# Patient Record
Sex: MALE | Race: WHITE | NOT HISPANIC OR LATINO | Employment: OTHER | URBAN - METROPOLITAN AREA
[De-identification: names, ages, dates, MRNs, and addresses within clinical notes are randomized per-mention and may not be internally consistent; named-entity substitution may affect disease eponyms.]

---

## 2017-01-03 NOTE — PATIENT DISCUSSION
Dry Eye Syndrome Counseling: I have discussed the diagnosis and the pathophysiology of this disease with the patient. Eyelid pathology and systemic illnesses such as Sjogren&rsquo;s disease or rheumatoid arthritis may contribute to severity. Vision may be limited by dry eye, and symptoms exacerbated by environmental factors such as smoke, wind, or prolonged eye use. Treatment options include, but are not limited to, artificial tears, punctal plugs, topical cyclosporine, oral omega-3 supplements, Lipiflow, moisture goggles, and lubricating ointments. I stressed the importance of compliance with treatment.

## 2017-01-03 NOTE — PATIENT DISCUSSION
BLEPHARITIS, OU: PRESCRIBE WARM COMPRESSES AND EYELID SCRUBS QD-BID, ARTIFICIAL TEARS BID-QID, THE DAILY INTAKE OF OMEGA-3 FATTY ACIDS. WILL CONSIDER LIPIFLOW TREATMENT NEXT VISIT IF NOT RESPONSIVE TO TREATMENT OR IF SYMPTOMS PERSIST. RETURN FOR FOLLOW-UP AS SCHEDULED.

## 2017-01-03 NOTE — PATIENT DISCUSSION
Blepharitis Counseling:   I have explained the diagnosis of blepharitis and its pathophysiology. I have explained to the patient that a cure for blepharitis is not usually possible, and successful management is dependent on patient compliance with the treatment regimen. I instructed the patient on using warm compresses and eyelid scrubs. I also instructed the patient on using artificial tears two to four times per day. Return for follow-up as scheduled or sooner if symptoms worsen.

## 2017-01-03 NOTE — PATIENT DISCUSSION
MODERATE DRY EYES: PRESCRIBED DISAPPEARING OTC PRESERVATIVE OR OTC PRESERVATIVE FREE ARTIFICIAL TEARS BID &ndash; QID4-6X A DAY, OU AND THE DAILY INTAKE OF OMEGA-3 DHA/EPA FATTY ACIDS TO HELP RELIEVE SYMPTOMS. ADD NIGHTLY LUBRICATING OINTMENT OR GEL. RX LOTEMAX OR FML QID X 4 DAYS, TID THEREARFTER. CONSIDER PUNCTAL PLUGS AND/OR LIPIFLOW TREATMENT NEXT VISIT IF NOT RESPONSIVE OR IF SYMPTOMS PERSIST. RETURN FOR FOLLOW-UP AS SCHEDULED OR SOONER IF SYMPTOMS WORSEN.

## 2017-01-19 NOTE — PATIENT DISCUSSION
MODERATE DRY EYES: PRESCRIBED DISAPPEARING OTC PRESERVATIVE OR OTC PRESERVATIVE FREE ARTIFICIAL TEARS BID &ndash; QID4-6X A DAY, OU AND THE DAILY INTAKE OF OMEGA-3 DHA/EPA FATTY ACIDS TO HELP RELIEVE SYMPTOMS. ADD NIGHTLY LUBRICATING OINTMENT OR GEL. TAPER FML BID X 1 WEEK, QD X 1 WEEK THEN DISCONTINUE. CONTINUE XIIDRA BID OU. CONSIDER PUNCTAL PLUGS AND/OR LIPIFLOW TREATMENT NEXT VISIT IF NOT RESPONSIVE OR IF SYMPTOMS PERSIST. RETURN FOR FOLLOW-UP AS SCHEDULED OR SOONER IF SYMPTOMS WORSEN.

## 2017-11-01 NOTE — PATIENT DISCUSSION
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  RETURN FOR FOLLOW-UP AS SCHEDULED FOR DILATED EYE EXAM.

## 2018-04-17 NOTE — PATIENT DISCUSSION
GLAUCOMA SUSPECT TESTING: DISCUSSED FOR PATIENT TO RETURN FOR FURTHER TESTING SUCH AS AN HVF 24-2, OCT RNFL, AND PACHYMETRY.

## 2018-08-10 ENCOUNTER — NEW REFERRAL (OUTPATIENT)
Dept: URBAN - METROPOLITAN AREA CLINIC 26 | Facility: CLINIC | Age: 83
End: 2018-08-10

## 2018-08-10 VITALS
DIASTOLIC BLOOD PRESSURE: 68 MMHG | SYSTOLIC BLOOD PRESSURE: 114 MMHG | HEIGHT: 69 IN | BODY MASS INDEX: 29.62 KG/M2 | WEIGHT: 200 LBS

## 2018-08-10 DIAGNOSIS — H35.3231: ICD-10-CM

## 2018-08-10 DIAGNOSIS — H43.813: ICD-10-CM

## 2018-08-10 DIAGNOSIS — H35.63: ICD-10-CM

## 2018-08-10 PROCEDURE — 92235 FLUORESCEIN ANGRPH MLTIFRAME: CPT

## 2018-08-10 PROCEDURE — 67028 INJECTION EYE DRUG: CPT

## 2018-08-10 PROCEDURE — J2778* LUCENTIS 0.1MG

## 2018-08-10 PROCEDURE — 92250 FUNDUS PHOTOGRAPHY W/I&R: CPT

## 2018-08-10 PROCEDURE — 99204 OFFICE O/P NEW MOD 45 MIN: CPT

## 2018-08-10 PROCEDURE — 92134 CPTRZ OPH DX IMG PST SGM RTA: CPT

## 2018-08-10 ASSESSMENT — VISUAL ACUITY
OS_CC: 20/60-1
OD_CC: 20/200+2
OD_SC: 20/200
OS_SC: 20/60-1

## 2018-08-10 ASSESSMENT — TONOMETRY
OD_IOP_MMHG: 13
OS_IOP_MMHG: 14

## 2018-09-14 ENCOUNTER — CLINICAL PROCEDURE AND DIAGNOSTIC TESTING ONLY (OUTPATIENT)
Dept: URBAN - METROPOLITAN AREA CLINIC 26 | Facility: CLINIC | Age: 83
End: 2018-09-14

## 2018-09-14 DIAGNOSIS — H35.3231: ICD-10-CM

## 2018-09-14 PROCEDURE — 67028 INJECTION EYE DRUG: CPT

## 2018-09-14 PROCEDURE — 92250 FUNDUS PHOTOGRAPHY W/I&R: CPT

## 2018-09-14 PROCEDURE — J2778* LUCENTIS 0.1MG

## 2018-09-14 ASSESSMENT — TONOMETRY
OS_IOP_MMHG: 10
OD_IOP_MMHG: 12

## 2018-09-14 ASSESSMENT — VISUAL ACUITY
OS_CC: 20/40-2
OD_CC: 20/80-2

## 2018-10-19 ENCOUNTER — CLINICAL PROCEDURE AND DIAGNOSTIC TESTING ONLY (OUTPATIENT)
Dept: URBAN - METROPOLITAN AREA CLINIC 26 | Facility: CLINIC | Age: 83
End: 2018-10-19

## 2018-10-19 DIAGNOSIS — H35.3231: ICD-10-CM

## 2018-10-19 PROCEDURE — 92250 FUNDUS PHOTOGRAPHY W/I&R: CPT

## 2018-10-19 PROCEDURE — 67028 INJECTION EYE DRUG: CPT

## 2018-10-19 PROCEDURE — J2778* LUCENTIS 0.1MG

## 2018-10-19 ASSESSMENT — VISUAL ACUITY
OS_CC: 20/40-2
OD_CC: 20/200+2

## 2018-10-19 ASSESSMENT — TONOMETRY
OD_IOP_MMHG: 11
OS_IOP_MMHG: 11

## 2018-11-30 ENCOUNTER — FOLLOW UP AND POST INJECTION EVALUATION (OUTPATIENT)
Dept: URBAN - METROPOLITAN AREA CLINIC 26 | Facility: CLINIC | Age: 83
End: 2018-11-30

## 2018-11-30 VITALS — DIASTOLIC BLOOD PRESSURE: 66 MMHG | HEART RATE: 60 BPM | HEIGHT: 60 IN | SYSTOLIC BLOOD PRESSURE: 108 MMHG

## 2018-11-30 DIAGNOSIS — H35.3231: ICD-10-CM

## 2018-11-30 DIAGNOSIS — H43.813: ICD-10-CM

## 2018-11-30 DIAGNOSIS — H35.63: ICD-10-CM

## 2018-11-30 PROCEDURE — 67028 INJECTION EYE DRUG: CPT

## 2018-11-30 PROCEDURE — 92014 COMPRE OPH EXAM EST PT 1/>: CPT

## 2018-11-30 PROCEDURE — 92235 FLUORESCEIN ANGRPH MLTIFRAME: CPT

## 2018-11-30 PROCEDURE — 92134 CPTRZ OPH DX IMG PST SGM RTA: CPT

## 2018-11-30 PROCEDURE — 92250 FUNDUS PHOTOGRAPHY W/I&R: CPT

## 2018-11-30 PROCEDURE — J2778* LUCENTIS 0.1MG

## 2018-11-30 ASSESSMENT — VISUAL ACUITY
OS_CC: 20/40+2
OD_CC: 20/200+2

## 2018-11-30 ASSESSMENT — TONOMETRY
OS_IOP_MMHG: 13
OD_IOP_MMHG: 12

## 2019-01-11 ENCOUNTER — CLINICAL PROCEDURE AND DIAGNOSTIC TESTING ONLY (OUTPATIENT)
Dept: URBAN - METROPOLITAN AREA CLINIC 26 | Facility: CLINIC | Age: 84
End: 2019-01-11

## 2019-01-11 DIAGNOSIS — H35.3231: ICD-10-CM

## 2019-01-11 PROCEDURE — 92134 CPTRZ OPH DX IMG PST SGM RTA: CPT

## 2019-01-11 PROCEDURE — 67028 INJECTION EYE DRUG: CPT

## 2019-01-11 PROCEDURE — J2778* LUCENTIS 0.1MG

## 2019-01-11 ASSESSMENT — TONOMETRY
OS_IOP_MMHG: 12
OD_IOP_MMHG: 13

## 2019-01-11 ASSESSMENT — VISUAL ACUITY
OD_CC: 20/200+1
OS_CC: 20/40+2

## 2019-02-15 ENCOUNTER — CLINICAL PROCEDURE AND DIAGNOSTIC TESTING ONLY (OUTPATIENT)
Dept: URBAN - METROPOLITAN AREA CLINIC 26 | Facility: CLINIC | Age: 84
End: 2019-02-15

## 2019-02-15 DIAGNOSIS — H35.3231: ICD-10-CM

## 2019-02-15 PROCEDURE — 67028 INJECTION EYE DRUG: CPT

## 2019-02-15 PROCEDURE — 92250 FUNDUS PHOTOGRAPHY W/I&R: CPT

## 2019-02-15 ASSESSMENT — VISUAL ACUITY
OS_CC: 20/40-1
OD_CC: 20/200-1

## 2019-02-15 ASSESSMENT — TONOMETRY
OS_IOP_MMHG: 14
OD_IOP_MMHG: 15

## 2019-03-22 ENCOUNTER — CLINICAL PROCEDURE AND DIAGNOSTIC TESTING ONLY (OUTPATIENT)
Dept: URBAN - METROPOLITAN AREA CLINIC 26 | Facility: CLINIC | Age: 84
End: 2019-03-22

## 2019-03-22 DIAGNOSIS — H35.3231: ICD-10-CM

## 2019-03-22 PROCEDURE — J2778* LUCENTIS 0.1MG

## 2019-03-22 PROCEDURE — 67028 INJECTION EYE DRUG: CPT

## 2019-03-22 PROCEDURE — 92250 FUNDUS PHOTOGRAPHY W/I&R: CPT

## 2019-03-22 ASSESSMENT — VISUAL ACUITY
OD_CC: 20/200-2
OS_CC: 20/30-1

## 2019-03-22 ASSESSMENT — TONOMETRY
OD_IOP_MMHG: 13
OS_IOP_MMHG: 14

## 2019-05-03 ENCOUNTER — CLINICAL PROCEDURE AND DIAGNOSTIC TESTING ONLY (OUTPATIENT)
Dept: URBAN - METROPOLITAN AREA CLINIC 26 | Facility: CLINIC | Age: 84
End: 2019-05-03

## 2019-05-03 DIAGNOSIS — H35.3231: ICD-10-CM

## 2019-05-03 PROCEDURE — 92250 FUNDUS PHOTOGRAPHY W/I&R: CPT

## 2019-05-03 PROCEDURE — 92134 CPTRZ OPH DX IMG PST SGM RTA: CPT

## 2019-05-03 PROCEDURE — J2778* LUCENTIS 0.1MG

## 2019-05-03 PROCEDURE — 67028 INJECTION EYE DRUG: CPT

## 2019-05-03 ASSESSMENT — VISUAL ACUITY
OS_CC: 20/30-2
OD_CC: 20/400-1

## 2019-05-03 ASSESSMENT — TONOMETRY
OS_IOP_MMHG: 13
OD_IOP_MMHG: 14

## 2019-06-21 ENCOUNTER — FOLLOW UP AND POST INJECTION EVALUATION (OUTPATIENT)
Dept: URBAN - METROPOLITAN AREA CLINIC 26 | Facility: CLINIC | Age: 84
End: 2019-06-21

## 2019-06-21 DIAGNOSIS — H43.813: ICD-10-CM

## 2019-06-21 DIAGNOSIS — H35.63: ICD-10-CM

## 2019-06-21 DIAGNOSIS — H35.3231: ICD-10-CM

## 2019-06-21 PROCEDURE — 92134 CPTRZ OPH DX IMG PST SGM RTA: CPT

## 2019-06-21 PROCEDURE — 92250 FUNDUS PHOTOGRAPHY W/I&R: CPT

## 2019-06-21 PROCEDURE — 67028 INJECTION EYE DRUG: CPT

## 2019-06-21 PROCEDURE — J2778* LUCENTIS 0.1MG

## 2019-06-21 PROCEDURE — 92014 COMPRE OPH EXAM EST PT 1/>: CPT

## 2019-06-21 ASSESSMENT — VISUAL ACUITY
OS_CC: 20/30-1
OD_CC: 20/200-1

## 2019-06-21 ASSESSMENT — TONOMETRY
OS_IOP_MMHG: 13
OD_IOP_MMHG: 13

## 2019-08-09 ENCOUNTER — CLINICAL PROCEDURE AND DIAGNOSTIC TESTING ONLY (OUTPATIENT)
Dept: URBAN - METROPOLITAN AREA CLINIC 26 | Facility: CLINIC | Age: 84
End: 2019-08-09

## 2019-08-09 DIAGNOSIS — H35.3231: ICD-10-CM

## 2019-08-09 PROCEDURE — 92250 FUNDUS PHOTOGRAPHY W/I&R: CPT

## 2019-08-09 PROCEDURE — 92134 CPTRZ OPH DX IMG PST SGM RTA: CPT

## 2019-08-09 PROCEDURE — J2778* LUCENTIS 0.1MG

## 2019-08-09 PROCEDURE — 67028 INJECTION EYE DRUG: CPT

## 2019-08-09 ASSESSMENT — VISUAL ACUITY
OD_CC: 20/400
OS_CC: 20/30+1

## 2019-08-09 ASSESSMENT — TONOMETRY
OD_IOP_MMHG: 14
OS_IOP_MMHG: 12

## 2019-09-20 ENCOUNTER — CLINICAL PROCEDURE AND DIAGNOSTIC TESTING ONLY (OUTPATIENT)
Dept: URBAN - METROPOLITAN AREA CLINIC 26 | Facility: CLINIC | Age: 84
End: 2019-09-20

## 2019-09-20 DIAGNOSIS — H35.3231: ICD-10-CM

## 2019-09-20 PROCEDURE — 92250 FUNDUS PHOTOGRAPHY W/I&R: CPT

## 2019-09-20 PROCEDURE — J2778* LUCENTIS 0.1MG

## 2019-09-20 PROCEDURE — 67028 INJECTION EYE DRUG: CPT

## 2019-09-20 ASSESSMENT — VISUAL ACUITY
OD_CC: 20/400
OS_CC: 20/40-1

## 2019-09-20 ASSESSMENT — TONOMETRY
OS_IOP_MMHG: 13
OD_IOP_MMHG: 11

## 2019-11-08 ENCOUNTER — CLINICAL PROCEDURE AND DIAGNOSTIC TESTING ONLY (OUTPATIENT)
Dept: URBAN - METROPOLITAN AREA CLINIC 26 | Facility: CLINIC | Age: 84
End: 2019-11-08

## 2019-11-08 DIAGNOSIS — H35.3231: ICD-10-CM

## 2019-11-08 PROCEDURE — 67028 INJECTION EYE DRUG: CPT

## 2019-11-08 PROCEDURE — J2778* LUCENTIS 0.1MG

## 2019-11-08 PROCEDURE — 92250 FUNDUS PHOTOGRAPHY W/I&R: CPT

## 2019-11-08 ASSESSMENT — VISUAL ACUITY
OS_CC: 20/40+1
OD_CC: 20/400-2

## 2019-11-08 ASSESSMENT — TONOMETRY
OS_IOP_MMHG: 13
OD_IOP_MMHG: 14

## 2019-12-20 ENCOUNTER — CLINICAL PROCEDURE AND DIAGNOSTIC TESTING ONLY (OUTPATIENT)
Dept: URBAN - METROPOLITAN AREA CLINIC 26 | Facility: CLINIC | Age: 84
End: 2019-12-20

## 2019-12-20 DIAGNOSIS — H35.3231: ICD-10-CM

## 2019-12-20 PROCEDURE — 92250 FUNDUS PHOTOGRAPHY W/I&R: CPT

## 2019-12-20 PROCEDURE — 67028 INJECTION EYE DRUG: CPT

## 2019-12-20 PROCEDURE — J2778* LUCENTIS 0.1MG

## 2019-12-20 PROCEDURE — 92134 CPTRZ OPH DX IMG PST SGM RTA: CPT

## 2019-12-20 ASSESSMENT — VISUAL ACUITY
OS_CC: 20/40-2
OD_CC: 20/400-1

## 2019-12-20 ASSESSMENT — TONOMETRY
OS_IOP_MMHG: 17
OD_IOP_MMHG: 15

## 2020-01-31 ENCOUNTER — CLINICAL PROCEDURE AND DIAGNOSTIC TESTING ONLY (OUTPATIENT)
Dept: URBAN - METROPOLITAN AREA CLINIC 26 | Facility: CLINIC | Age: 85
End: 2020-01-31

## 2020-01-31 DIAGNOSIS — H35.3231: ICD-10-CM

## 2020-01-31 PROCEDURE — 92250 FUNDUS PHOTOGRAPHY W/I&R: CPT

## 2020-01-31 PROCEDURE — J2778* LUCENTIS 0.1MG

## 2020-01-31 PROCEDURE — 92134 CPTRZ OPH DX IMG PST SGM RTA: CPT

## 2020-01-31 PROCEDURE — 67028 INJECTION EYE DRUG: CPT

## 2020-01-31 ASSESSMENT — TONOMETRY
OD_IOP_MMHG: 13
OS_IOP_MMHG: 12

## 2020-01-31 ASSESSMENT — VISUAL ACUITY
OD_PH: 20/400-1
OS_CC: 20/30-2
OD_CC: 20/400-2

## 2020-03-13 ENCOUNTER — CLINICAL PROCEDURE AND DIAGNOSTIC TESTING ONLY (OUTPATIENT)
Dept: URBAN - METROPOLITAN AREA CLINIC 26 | Facility: CLINIC | Age: 85
End: 2020-03-13

## 2020-03-13 DIAGNOSIS — H35.3231: ICD-10-CM

## 2020-03-13 PROCEDURE — 67028 INJECTION EYE DRUG: CPT

## 2020-03-13 PROCEDURE — 92250 FUNDUS PHOTOGRAPHY W/I&R: CPT

## 2020-03-13 PROCEDURE — J2778* LUCENTIS 0.1MG

## 2020-03-13 PROCEDURE — 92134 CPTRZ OPH DX IMG PST SGM RTA: CPT

## 2020-03-13 ASSESSMENT — VISUAL ACUITY
OD_CC: 20/200-2
OS_CC: 20/30-1

## 2020-03-13 ASSESSMENT — TONOMETRY
OD_IOP_MMHG: 14
OS_IOP_MMHG: 12

## 2020-04-24 ENCOUNTER — FOLLOW UP AND POST INJECTION EVALUATION (OUTPATIENT)
Dept: URBAN - METROPOLITAN AREA CLINIC 26 | Facility: CLINIC | Age: 85
End: 2020-04-24

## 2020-04-24 DIAGNOSIS — H35.3231: ICD-10-CM

## 2020-04-24 DIAGNOSIS — H43.813: ICD-10-CM

## 2020-04-24 DIAGNOSIS — H35.63: ICD-10-CM

## 2020-04-24 PROCEDURE — 92012 INTRM OPH EXAM EST PATIENT: CPT

## 2020-04-24 PROCEDURE — 92134 CPTRZ OPH DX IMG PST SGM RTA: CPT

## 2020-04-24 PROCEDURE — J2778* LUCENTIS 0.1MG

## 2020-04-24 PROCEDURE — 92250 FUNDUS PHOTOGRAPHY W/I&R: CPT

## 2020-04-24 PROCEDURE — 67028 INJECTION EYE DRUG: CPT

## 2020-04-24 ASSESSMENT — TONOMETRY
OS_IOP_MMHG: 12
OD_IOP_MMHG: 14

## 2020-04-24 ASSESSMENT — VISUAL ACUITY
OS_CC: 20/30-2
OD_CC: 20/400-1

## 2020-06-12 ENCOUNTER — CLINICAL PROCEDURE AND DIAGNOSTIC TESTING ONLY (OUTPATIENT)
Dept: URBAN - METROPOLITAN AREA CLINIC 26 | Facility: CLINIC | Age: 85
End: 2020-06-12

## 2020-06-12 DIAGNOSIS — H35.3231: ICD-10-CM

## 2020-06-12 PROCEDURE — J2778* LUCENTIS 0.1MG

## 2020-06-12 PROCEDURE — 67028 INJECTION EYE DRUG: CPT

## 2020-06-12 PROCEDURE — 92134 CPTRZ OPH DX IMG PST SGM RTA: CPT

## 2020-06-12 PROCEDURE — 92250 FUNDUS PHOTOGRAPHY W/I&R: CPT

## 2020-06-12 ASSESSMENT — TONOMETRY
OS_IOP_MMHG: 13
OD_IOP_MMHG: 12

## 2020-06-12 ASSESSMENT — VISUAL ACUITY
OD_CC: 20/400-2
OS_CC: 20/30-1

## 2020-07-14 ENCOUNTER — CLINICAL PROCEDURE AND DIAGNOSTIC TESTING ONLY (OUTPATIENT)
Dept: URBAN - METROPOLITAN AREA CLINIC 26 | Facility: CLINIC | Age: 85
End: 2020-07-14

## 2020-07-14 DIAGNOSIS — H35.3231: ICD-10-CM

## 2020-07-14 PROCEDURE — J2778* LUCENTIS 0.1MG

## 2020-07-14 PROCEDURE — 67028 INJECTION EYE DRUG: CPT

## 2020-07-14 PROCEDURE — 92134 CPTRZ OPH DX IMG PST SGM RTA: CPT

## 2020-07-14 PROCEDURE — 92250 FUNDUS PHOTOGRAPHY W/I&R: CPT

## 2020-07-14 ASSESSMENT — TONOMETRY
OS_IOP_MMHG: 10
OD_IOP_MMHG: 15

## 2020-07-14 ASSESSMENT — VISUAL ACUITY
OS_CC: 20/30-2
OD_CC: 20/400-2

## 2020-09-04 ENCOUNTER — CLINICAL PROCEDURE AND DIAGNOSTIC TESTING ONLY (OUTPATIENT)
Dept: URBAN - METROPOLITAN AREA CLINIC 26 | Facility: CLINIC | Age: 85
End: 2020-09-04

## 2020-09-04 DIAGNOSIS — H35.3231: ICD-10-CM

## 2020-09-04 PROCEDURE — 92134 CPTRZ OPH DX IMG PST SGM RTA: CPT

## 2020-09-04 PROCEDURE — 67028 INJECTION EYE DRUG: CPT

## 2020-09-04 PROCEDURE — J2778* LUCENTIS 0.1MG

## 2020-09-04 PROCEDURE — 92250 FUNDUS PHOTOGRAPHY W/I&R: CPT

## 2020-09-04 ASSESSMENT — TONOMETRY
OS_IOP_MMHG: 16
OD_IOP_MMHG: 17

## 2020-09-04 ASSESSMENT — VISUAL ACUITY
OD_CC: 20/400-2
OS_CC: 20/30-1

## 2020-10-16 ENCOUNTER — CLINICAL PROCEDURE AND DIAGNOSTIC TESTING ONLY (OUTPATIENT)
Dept: URBAN - METROPOLITAN AREA CLINIC 26 | Facility: CLINIC | Age: 85
End: 2020-10-16

## 2020-10-16 DIAGNOSIS — H35.63: ICD-10-CM

## 2020-10-16 DIAGNOSIS — H35.3231: ICD-10-CM

## 2020-10-16 DIAGNOSIS — H43.813: ICD-10-CM

## 2020-10-16 PROCEDURE — J2778* LUCENTIS 0.1MG

## 2020-10-16 PROCEDURE — 92012 INTRM OPH EXAM EST PATIENT: CPT

## 2020-10-16 PROCEDURE — 67028 INJECTION EYE DRUG: CPT

## 2020-10-16 ASSESSMENT — VISUAL ACUITY
OD_CC: CF 2FT
OS_CC: 20/30-2

## 2020-10-16 ASSESSMENT — TONOMETRY
OD_IOP_MMHG: 16
OS_IOP_MMHG: 14

## 2020-11-01 ENCOUNTER — OFFICE VISIT (OUTPATIENT)
Age: 85
End: 2020-11-01

## 2020-11-27 ENCOUNTER — CLINICAL PROCEDURE AND DIAGNOSTIC TESTING ONLY (OUTPATIENT)
Dept: URBAN - METROPOLITAN AREA CLINIC 26 | Facility: CLINIC | Age: 85
End: 2020-11-27

## 2020-11-27 DIAGNOSIS — H35.3231: ICD-10-CM

## 2020-11-27 PROCEDURE — 92134 CPTRZ OPH DX IMG PST SGM RTA: CPT

## 2020-11-27 PROCEDURE — J2778* LUCENTIS 0.1MG

## 2020-11-27 PROCEDURE — 67028 INJECTION EYE DRUG: CPT

## 2020-11-27 PROCEDURE — 92250 FUNDUS PHOTOGRAPHY W/I&R: CPT

## 2020-11-27 ASSESSMENT — TONOMETRY
OS_IOP_MMHG: 13
OD_IOP_MMHG: 14

## 2020-11-27 ASSESSMENT — VISUAL ACUITY
OS_CC: 20/30-1
OD_CC: CF 2FT

## 2021-01-07 ENCOUNTER — FOLLOW UP AND POST INJECTION EVALUATION (OUTPATIENT)
Dept: URBAN - METROPOLITAN AREA CLINIC 26 | Facility: CLINIC | Age: 86
End: 2021-01-07

## 2021-01-07 DIAGNOSIS — H43.813: ICD-10-CM

## 2021-01-07 DIAGNOSIS — H35.3231: ICD-10-CM

## 2021-01-07 DIAGNOSIS — H35.63: ICD-10-CM

## 2021-01-07 PROCEDURE — 92134 CPTRZ OPH DX IMG PST SGM RTA: CPT

## 2021-01-07 PROCEDURE — 92012 INTRM OPH EXAM EST PATIENT: CPT

## 2021-01-07 PROCEDURE — 92250 FUNDUS PHOTOGRAPHY W/I&R: CPT

## 2021-01-07 PROCEDURE — 67028 INJECTION EYE DRUG: CPT

## 2021-01-07 PROCEDURE — J2778* LUCENTIS 0.1MG

## 2021-01-07 ASSESSMENT — VISUAL ACUITY
OS_CC: 20/30-2
OD_CC: 20/400

## 2021-01-07 ASSESSMENT — TONOMETRY
OD_IOP_MMHG: 15
OS_IOP_MMHG: 13

## 2021-02-19 ENCOUNTER — CLINICAL PROCEDURE AND DIAGNOSTIC TESTING ONLY (OUTPATIENT)
Dept: URBAN - METROPOLITAN AREA CLINIC 26 | Facility: CLINIC | Age: 86
End: 2021-02-19

## 2021-02-19 DIAGNOSIS — H35.3231: ICD-10-CM

## 2021-02-19 PROCEDURE — J2778* LUCENTIS 0.1MG

## 2021-02-19 PROCEDURE — 67028 INJECTION EYE DRUG: CPT

## 2021-02-19 PROCEDURE — 92134 CPTRZ OPH DX IMG PST SGM RTA: CPT

## 2021-02-19 PROCEDURE — 92250 FUNDUS PHOTOGRAPHY W/I&R: CPT

## 2021-04-02 ENCOUNTER — CLINICAL PROCEDURE AND DIAGNOSTIC TESTING ONLY (OUTPATIENT)
Dept: URBAN - METROPOLITAN AREA CLINIC 26 | Facility: CLINIC | Age: 86
End: 2021-04-02

## 2021-04-02 DIAGNOSIS — H35.3231: ICD-10-CM

## 2021-04-02 PROCEDURE — J2778* LUCENTIS 0.1MG

## 2021-04-02 PROCEDURE — 67028 INJECTION EYE DRUG: CPT

## 2021-04-02 PROCEDURE — 92134 CPTRZ OPH DX IMG PST SGM RTA: CPT

## 2021-04-02 PROCEDURE — 92250 FUNDUS PHOTOGRAPHY W/I&R: CPT

## 2021-05-01 ENCOUNTER — OFFICE VISIT (OUTPATIENT)
Age: 86
End: 2021-05-01

## 2021-05-03 NOTE — PATIENT DISCUSSION
EPIRETINAL MEMBRANE, OD. PATIENT EDUCATION GIVEN. NOT VISUALLY SIGNIFICANT TO THE PATIENT AT THIS TIME. RETURN FOR FOLLOW UP AS SCHEDULED FOR FUTHER OCT TESTING EVALUATION AND MANAGEMENT.

## 2021-05-14 ENCOUNTER — CLINICAL PROCEDURE AND DIAGNOSTIC TESTING ONLY (OUTPATIENT)
Dept: URBAN - METROPOLITAN AREA CLINIC 26 | Facility: CLINIC | Age: 86
End: 2021-05-14

## 2021-05-14 DIAGNOSIS — H35.3231: ICD-10-CM

## 2021-05-14 PROCEDURE — J2778* LUCENTIS 0.1MG

## 2021-05-14 PROCEDURE — 67028 INJECTION EYE DRUG: CPT

## 2021-05-14 PROCEDURE — 92250 FUNDUS PHOTOGRAPHY W/I&R: CPT

## 2021-05-14 PROCEDURE — 92134 CPTRZ OPH DX IMG PST SGM RTA: CPT

## 2021-06-25 ENCOUNTER — CLINICAL PROCEDURE AND DIAGNOSTIC TESTING ONLY (OUTPATIENT)
Dept: URBAN - METROPOLITAN AREA CLINIC 26 | Facility: CLINIC | Age: 86
End: 2021-06-25

## 2021-06-25 DIAGNOSIS — H35.3231: ICD-10-CM

## 2021-06-25 PROCEDURE — J2778* LUCENTIS 0.1MG

## 2021-06-25 PROCEDURE — 92134 CPTRZ OPH DX IMG PST SGM RTA: CPT

## 2021-06-25 PROCEDURE — 92250 FUNDUS PHOTOGRAPHY W/I&R: CPT

## 2021-06-25 PROCEDURE — 67028 INJECTION EYE DRUG: CPT

## 2021-07-26 ENCOUNTER — OFFICE VISIT (OUTPATIENT)
Dept: URBAN - METROPOLITAN AREA CLINIC 7 | Facility: CLINIC | Age: 86
End: 2021-07-26

## 2021-07-26 ENCOUNTER — TELEPHONE ENCOUNTER (OUTPATIENT)
Dept: URBAN - METROPOLITAN AREA CLINIC 9 | Facility: CLINIC | Age: 86
End: 2021-07-26

## 2021-07-30 LAB — MEDICARE: FECAL-OCCULT BLOOD TEST: NEGATIVE

## 2021-08-13 ENCOUNTER — CLINICAL PROCEDURE AND DIAGNOSTIC TESTING ONLY (OUTPATIENT)
Dept: URBAN - METROPOLITAN AREA CLINIC 26 | Facility: CLINIC | Age: 86
End: 2021-08-13

## 2021-08-13 DIAGNOSIS — H35.3231: ICD-10-CM

## 2021-08-13 PROCEDURE — 92250 FUNDUS PHOTOGRAPHY W/I&R: CPT

## 2021-08-13 PROCEDURE — 67028 INJECTION EYE DRUG: CPT

## 2021-08-13 PROCEDURE — J2778* LUCENTIS 0.1MG

## 2021-08-13 PROCEDURE — 92134 CPTRZ OPH DX IMG PST SGM RTA: CPT

## 2021-08-13 ASSESSMENT — TONOMETRY
OD_IOP_MMHG: 18
OS_IOP_MMHG: 11

## 2021-08-13 ASSESSMENT — VISUAL ACUITY
OD_SC: 20/400
OS_SC: 20/40-2

## 2021-08-27 ENCOUNTER — TELEPHONE ENCOUNTER (OUTPATIENT)
Dept: URBAN - METROPOLITAN AREA CLINIC 9 | Facility: CLINIC | Age: 86
End: 2021-08-27

## 2021-10-01 ENCOUNTER — CLINICAL PROCEDURE AND DIAGNOSTIC TESTING ONLY (OUTPATIENT)
Dept: URBAN - METROPOLITAN AREA CLINIC 26 | Facility: CLINIC | Age: 86
End: 2021-10-01

## 2021-10-01 DIAGNOSIS — H35.3211: ICD-10-CM

## 2021-10-01 DIAGNOSIS — H35.3221: ICD-10-CM

## 2021-10-01 PROCEDURE — 92134 CPTRZ OPH DX IMG PST SGM RTA: CPT

## 2021-10-01 PROCEDURE — 67028 INJECTION EYE DRUG: CPT

## 2021-10-01 PROCEDURE — J277850 RANIBIZUMAB (LUCENTIS) BILATERAL

## 2021-11-12 ENCOUNTER — CLINICAL PROCEDURE AND DIAGNOSTIC TESTING ONLY (OUTPATIENT)
Dept: URBAN - METROPOLITAN AREA CLINIC 26 | Facility: CLINIC | Age: 86
End: 2021-11-12

## 2021-11-12 DIAGNOSIS — H35.3211: ICD-10-CM

## 2021-11-12 DIAGNOSIS — H35.3221: ICD-10-CM

## 2021-11-12 PROCEDURE — J277850 RANIBIZUMAB (LUCENTIS) BILATERAL

## 2021-11-12 PROCEDURE — 67028 INJECTION EYE DRUG: CPT

## 2021-11-12 PROCEDURE — 92134 CPTRZ OPH DX IMG PST SGM RTA: CPT

## 2021-12-17 ENCOUNTER — CLINIC PROCEDURE ONLY (OUTPATIENT)
Dept: URBAN - METROPOLITAN AREA CLINIC 26 | Facility: CLINIC | Age: 86
End: 2021-12-17

## 2021-12-17 DIAGNOSIS — H35.3211: ICD-10-CM

## 2021-12-17 DIAGNOSIS — H35.3221: ICD-10-CM

## 2021-12-17 PROCEDURE — 92134 CPTRZ OPH DX IMG PST SGM RTA: CPT

## 2021-12-17 PROCEDURE — 67028 INJECTION EYE DRUG: CPT

## 2021-12-17 PROCEDURE — 92250 FUNDUS PHOTOGRAPHY W/I&R: CPT

## 2021-12-17 PROCEDURE — J277850 RANIBIZUMAB (LUCENTIS) BILATERAL

## 2022-01-28 ENCOUNTER — FOLLOW UP (OUTPATIENT)
Dept: URBAN - METROPOLITAN AREA CLINIC 26 | Facility: CLINIC | Age: 87
End: 2022-01-28

## 2022-01-28 DIAGNOSIS — H35.3211: ICD-10-CM

## 2022-01-28 DIAGNOSIS — H43.813: ICD-10-CM

## 2022-01-28 DIAGNOSIS — H35.3221: ICD-10-CM

## 2022-01-28 DIAGNOSIS — H04.123: ICD-10-CM

## 2022-01-28 DIAGNOSIS — H35.63: ICD-10-CM

## 2022-01-28 PROCEDURE — 92014 COMPRE OPH EXAM EST PT 1/>: CPT

## 2022-01-28 PROCEDURE — J277850 RANIBIZUMAB (LUCENTIS) BILATERAL

## 2022-01-28 PROCEDURE — 67028 INJECTION EYE DRUG: CPT

## 2022-01-28 PROCEDURE — 92134 CPTRZ OPH DX IMG PST SGM RTA: CPT

## 2022-01-28 PROCEDURE — 92250 FUNDUS PHOTOGRAPHY W/I&R: CPT

## 2022-01-28 ASSESSMENT — TONOMETRY
OD_IOP_MMHG: 14
OS_IOP_MMHG: 15

## 2022-01-28 ASSESSMENT — VISUAL ACUITY
OD_CC: CF 2FT
OS_CC: 20/50+2

## 2022-03-03 ENCOUNTER — CLINIC PROCEDURE ONLY (OUTPATIENT)
Dept: URBAN - METROPOLITAN AREA CLINIC 26 | Facility: CLINIC | Age: 87
End: 2022-03-03

## 2022-03-03 DIAGNOSIS — H35.3221: ICD-10-CM

## 2022-03-03 DIAGNOSIS — H35.3211: ICD-10-CM

## 2022-03-03 PROCEDURE — 92134 CPTRZ OPH DX IMG PST SGM RTA: CPT

## 2022-03-03 PROCEDURE — J277850 RANIBIZUMAB (LUCENTIS) BILATERAL

## 2022-03-03 PROCEDURE — 67028 INJECTION EYE DRUG: CPT

## 2022-07-30 ENCOUNTER — TELEPHONE ENCOUNTER (OUTPATIENT)
Age: 87
End: 2022-07-30

## 2022-07-30 RX ORDER — WHEAT DEXTRIN 3 G/4 G
1 (ONE) TABLESPOON(S) POWDER (GRAM) ORAL
Qty: 0 | Refills: 10 | OUTPATIENT
Start: 2013-12-31 | End: 2019-07-26

## 2022-07-30 RX ORDER — LORATADINE 5 MG
17 GRAMS POWDER TABLET,CHEWABLE ORAL
Qty: 0 | Refills: 16 | OUTPATIENT
Start: 2013-12-31 | End: 2019-07-26

## 2022-07-31 ENCOUNTER — TELEPHONE ENCOUNTER (OUTPATIENT)
Age: 87
End: 2022-07-31

## 2022-07-31 RX ORDER — WHEAT DEXTRIN 3 G/4 G
1 (ONE) POWDER (GRAM) ORAL
Qty: 0 | Refills: 10 | Status: ACTIVE | COMMUNITY
Start: 2013-12-31

## 2022-07-31 RX ORDER — LORATADINE 5 MG
17 GRAMS POWDER TABLET,CHEWABLE ORAL
Qty: 0 | Refills: 16 | Status: ACTIVE | COMMUNITY
Start: 2021-07-26

## 2022-07-31 RX ORDER — LORATADINE 5 MG
17 GRAMS POWDER TABLET,CHEWABLE ORAL
Qty: 0 | Refills: 16 | Status: ACTIVE | COMMUNITY
Start: 2013-12-31

## 2022-11-18 ENCOUNTER — FOLLOW UP (OUTPATIENT)
Dept: URBAN - METROPOLITAN AREA CLINIC 26 | Facility: CLINIC | Age: 87
End: 2022-11-18

## 2022-11-18 DIAGNOSIS — H43.813: ICD-10-CM

## 2022-11-18 DIAGNOSIS — H04.123: ICD-10-CM

## 2022-11-18 DIAGNOSIS — H35.63: ICD-10-CM

## 2022-11-18 DIAGNOSIS — H35.3231: ICD-10-CM

## 2022-11-18 PROCEDURE — 92134 CPTRZ OPH DX IMG PST SGM RTA: CPT

## 2022-11-18 PROCEDURE — 92014 COMPRE OPH EXAM EST PT 1/>: CPT

## 2022-11-18 PROCEDURE — 67028 INJECTION EYE DRUG: CPT

## 2022-11-18 PROCEDURE — 92250 FUNDUS PHOTOGRAPHY W/I&R: CPT

## 2022-11-18 ASSESSMENT — VISUAL ACUITY
OD_CC: CF 2FT
OS_CC: 20/40-1

## 2022-11-18 ASSESSMENT — TONOMETRY
OS_IOP_MMHG: 16
OD_IOP_MMHG: 18

## 2022-12-23 ENCOUNTER — CLINIC PROCEDURE ONLY (OUTPATIENT)
Dept: URBAN - METROPOLITAN AREA CLINIC 26 | Facility: CLINIC | Age: 87
End: 2022-12-23

## 2022-12-23 PROCEDURE — 92134 CPTRZ OPH DX IMG PST SGM RTA: CPT

## 2022-12-23 PROCEDURE — 67028 INJECTION EYE DRUG: CPT

## 2022-12-23 PROCEDURE — 92250 FUNDUS PHOTOGRAPHY W/I&R: CPT

## 2023-01-09 NOTE — PATIENT DISCUSSION
Discussed condition and exacerbating conditions/situations (e.g., dry/arid environments, overhead fans, air conditioners, side effect of medications).
Discussed lid hygiene with Magalis De Luna, or Eliane. Trae Posada
Discussed lid hygiene, warm compress and eyelid wash.
Discussed the importance of blood sugar control in the prevention of ocular complications.
GLAUCOMA SUSPECT, OU : ENLARGED OPTIC NERVE CUPPING. RETURN FOR FOLLOW UP AS SCHEDULED.
Patient educated on condition.
Patient understands condition, prognosis and need for follow up care.
Recommended artificial tears to use: 1 drop 4x a day in both eyes.
Warm Compresses.
impaired balance/decreased flexibility/decreased strength

## 2023-04-07 ENCOUNTER — CLINIC PROCEDURE ONLY (OUTPATIENT)
Dept: URBAN - METROPOLITAN AREA CLINIC 26 | Facility: CLINIC | Age: 88
End: 2023-04-07

## 2023-04-07 DIAGNOSIS — H35.3231: ICD-10-CM

## 2023-04-07 PROCEDURE — 92250 FUNDUS PHOTOGRAPHY W/I&R: CPT

## 2023-04-07 PROCEDURE — 92134 CPTRZ OPH DX IMG PST SGM RTA: CPT

## 2023-04-07 PROCEDURE — 67028 INJECTION EYE DRUG: CPT

## 2023-07-13 ENCOUNTER — FOLLOW UP (OUTPATIENT)
Dept: URBAN - METROPOLITAN AREA CLINIC 26 | Facility: CLINIC | Age: 88
End: 2023-07-13

## 2023-07-13 VITALS — HEIGHT: 69 IN | WEIGHT: 206 LBS | BODY MASS INDEX: 30.51 KG/M2

## 2023-07-13 DIAGNOSIS — H35.3231: ICD-10-CM

## 2023-07-13 PROCEDURE — 92250 FUNDUS PHOTOGRAPHY W/I&R: CPT

## 2023-07-13 PROCEDURE — 92134 CPTRZ OPH DX IMG PST SGM RTA: CPT

## 2023-07-13 PROCEDURE — 67028 INJECTION EYE DRUG: CPT

## 2023-07-13 ASSESSMENT — TONOMETRY
OS_IOP_MMHG: 15
OD_IOP_MMHG: 13

## 2023-07-13 ASSESSMENT — VISUAL ACUITY
OS_CC: 20/40+1
OD_CC: CF 4FT
OS_PH: 20/40+2

## 2023-08-25 ENCOUNTER — CLINIC PROCEDURE ONLY (OUTPATIENT)
Dept: URBAN - METROPOLITAN AREA CLINIC 26 | Facility: CLINIC | Age: 88
End: 2023-08-25

## 2023-08-25 DIAGNOSIS — H35.3231: ICD-10-CM

## 2023-08-25 PROCEDURE — 92134 CPTRZ OPH DX IMG PST SGM RTA: CPT

## 2023-08-25 PROCEDURE — 67028 INJECTION EYE DRUG: CPT

## 2023-08-25 PROCEDURE — 92250 FUNDUS PHOTOGRAPHY W/I&R: CPT

## 2023-09-12 ENCOUNTER — EMERGENCY VISIT (OUTPATIENT)
Dept: URBAN - METROPOLITAN AREA CLINIC 26 | Facility: CLINIC | Age: 88
End: 2023-09-12

## 2023-09-12 DIAGNOSIS — H04.123: ICD-10-CM

## 2023-09-12 DIAGNOSIS — H35.3231: ICD-10-CM

## 2023-09-12 DIAGNOSIS — H43.813: ICD-10-CM

## 2023-09-12 DIAGNOSIS — H35.63: ICD-10-CM

## 2023-09-12 PROCEDURE — 92012 INTRM OPH EXAM EST PATIENT: CPT

## 2023-09-12 ASSESSMENT — VISUAL ACUITY
OS_CC: 20/50+2
OD_CC: CF 3FT

## 2023-09-12 ASSESSMENT — TONOMETRY
OS_IOP_MMHG: 17
OD_IOP_MMHG: 20

## 2023-10-05 ENCOUNTER — CLINIC PROCEDURE ONLY (OUTPATIENT)
Dept: URBAN - METROPOLITAN AREA CLINIC 26 | Facility: CLINIC | Age: 88
End: 2023-10-05

## 2023-10-05 DIAGNOSIS — H35.3231: ICD-10-CM

## 2023-10-05 PROCEDURE — 92134 CPTRZ OPH DX IMG PST SGM RTA: CPT

## 2023-10-05 PROCEDURE — 92250 FUNDUS PHOTOGRAPHY W/I&R: CPT | Mod: 59

## 2023-10-05 PROCEDURE — 67028 INJECTION EYE DRUG: CPT

## 2023-11-16 ENCOUNTER — FOLLOW UP (OUTPATIENT)
Dept: URBAN - METROPOLITAN AREA CLINIC 26 | Facility: CLINIC | Age: 88
End: 2023-11-16

## 2023-11-16 DIAGNOSIS — H43.813: ICD-10-CM

## 2023-11-16 DIAGNOSIS — H04.123: ICD-10-CM

## 2023-11-16 DIAGNOSIS — H35.63: ICD-10-CM

## 2023-11-16 DIAGNOSIS — H53.19: ICD-10-CM

## 2023-11-16 DIAGNOSIS — H35.3134: ICD-10-CM

## 2023-11-16 DIAGNOSIS — H35.3231: ICD-10-CM

## 2023-11-16 PROCEDURE — 67028 INJECTION EYE DRUG: CPT

## 2023-11-16 PROCEDURE — 92012 INTRM OPH EXAM EST PATIENT: CPT

## 2023-11-16 PROCEDURE — 92250 FUNDUS PHOTOGRAPHY W/I&R: CPT

## 2023-11-16 PROCEDURE — 92134 CPTRZ OPH DX IMG PST SGM RTA: CPT

## 2023-11-16 ASSESSMENT — VISUAL ACUITY
OD_SC: CF 2FT
OS_SC: 20/70

## 2023-11-16 ASSESSMENT — TONOMETRY
OS_IOP_MMHG: 17
OD_IOP_MMHG: 16

## 2023-12-28 ENCOUNTER — CLINIC PROCEDURE ONLY (OUTPATIENT)
Dept: URBAN - METROPOLITAN AREA CLINIC 26 | Facility: CLINIC | Age: 88
End: 2023-12-28

## 2023-12-28 DIAGNOSIS — H35.3231: ICD-10-CM

## 2023-12-28 PROCEDURE — 92134 CPTRZ OPH DX IMG PST SGM RTA: CPT

## 2023-12-28 PROCEDURE — 67028 INJECTION EYE DRUG: CPT

## 2023-12-28 PROCEDURE — 92250 FUNDUS PHOTOGRAPHY W/I&R: CPT

## 2024-02-22 ENCOUNTER — FOLLOW UP (OUTPATIENT)
Dept: URBAN - METROPOLITAN AREA CLINIC 26 | Facility: CLINIC | Age: 89
End: 2024-02-22

## 2024-02-22 DIAGNOSIS — H53.19: ICD-10-CM

## 2024-02-22 DIAGNOSIS — H35.3134: ICD-10-CM

## 2024-02-22 DIAGNOSIS — H35.3231: ICD-10-CM

## 2024-02-22 DIAGNOSIS — H43.813: ICD-10-CM

## 2024-02-22 DIAGNOSIS — H04.123: ICD-10-CM

## 2024-02-22 DIAGNOSIS — H18.593: ICD-10-CM

## 2024-02-22 DIAGNOSIS — H35.63: ICD-10-CM

## 2024-02-22 PROCEDURE — 92012 INTRM OPH EXAM EST PATIENT: CPT

## 2024-02-22 PROCEDURE — 92134 CPTRZ OPH DX IMG PST SGM RTA: CPT

## 2024-02-22 PROCEDURE — 92250 FUNDUS PHOTOGRAPHY W/I&R: CPT

## 2024-03-14 ENCOUNTER — CLINIC PROCEDURE ONLY (OUTPATIENT)
Dept: URBAN - METROPOLITAN AREA CLINIC 26 | Facility: CLINIC | Age: 89
End: 2024-03-14

## 2024-03-14 DIAGNOSIS — H18.593: ICD-10-CM

## 2024-03-14 DIAGNOSIS — H43.813: ICD-10-CM

## 2024-03-14 DIAGNOSIS — H04.123: ICD-10-CM

## 2024-03-14 DIAGNOSIS — H53.19: ICD-10-CM

## 2024-03-14 DIAGNOSIS — H35.3134: ICD-10-CM

## 2024-03-14 DIAGNOSIS — H35.63: ICD-10-CM

## 2024-03-14 DIAGNOSIS — H35.3231: ICD-10-CM

## 2024-03-14 PROCEDURE — 92012 INTRM OPH EXAM EST PATIENT: CPT

## 2024-03-14 PROCEDURE — 67028 INJECTION EYE DRUG: CPT

## 2024-03-14 ASSESSMENT — VISUAL ACUITY
OD_CC: 20/400-2
OS_CC: 20/70-2

## 2024-03-14 ASSESSMENT — TONOMETRY
OS_IOP_MMHG: 17
OD_IOP_MMHG: 16

## 2024-05-16 ENCOUNTER — CLINIC PROCEDURE ONLY (OUTPATIENT)
Dept: URBAN - METROPOLITAN AREA CLINIC 26 | Facility: CLINIC | Age: 89
End: 2024-05-16

## 2024-05-16 DIAGNOSIS — H35.3231: ICD-10-CM

## 2024-05-16 PROCEDURE — 92134 CPTRZ OPH DX IMG PST SGM RTA: CPT

## 2024-05-16 PROCEDURE — 92250 FUNDUS PHOTOGRAPHY W/I&R: CPT | Mod: 59

## 2024-05-16 PROCEDURE — 67028 INJECTION EYE DRUG: CPT

## 2024-07-24 ENCOUNTER — CLINIC PROCEDURE ONLY (OUTPATIENT)
Dept: URBAN - METROPOLITAN AREA CLINIC 26 | Facility: CLINIC | Age: 89
End: 2024-07-24

## 2024-07-24 DIAGNOSIS — H35.3231: ICD-10-CM

## 2024-07-24 PROCEDURE — 92134 CPTRZ OPH DX IMG PST SGM RTA: CPT

## 2024-07-24 PROCEDURE — 92250 FUNDUS PHOTOGRAPHY W/I&R: CPT | Mod: 59

## 2024-07-24 PROCEDURE — 67028 INJECTION EYE DRUG: CPT

## 2024-07-24 ASSESSMENT — TONOMETRY
OD_IOP_MMHG: 16
OS_IOP_MMHG: 17

## 2024-10-01 ENCOUNTER — FOLLOW UP (OUTPATIENT)
Dept: URBAN - METROPOLITAN AREA CLINIC 26 | Facility: CLINIC | Age: 89
End: 2024-10-01

## 2024-10-01 DIAGNOSIS — H35.63: ICD-10-CM

## 2024-10-01 DIAGNOSIS — H18.593: ICD-10-CM

## 2024-10-01 DIAGNOSIS — H35.3134: ICD-10-CM

## 2024-10-01 DIAGNOSIS — H35.3231: ICD-10-CM

## 2024-10-01 DIAGNOSIS — H04.123: ICD-10-CM

## 2024-10-01 DIAGNOSIS — H43.813: ICD-10-CM

## 2024-10-01 DIAGNOSIS — H53.19: ICD-10-CM

## 2024-10-01 PROCEDURE — 92134 CPTRZ OPH DX IMG PST SGM RTA: CPT

## 2024-10-01 PROCEDURE — 92014 COMPRE OPH EXAM EST PT 1/>: CPT | Mod: 25

## 2024-10-01 PROCEDURE — 67028 INJECTION EYE DRUG: CPT

## 2024-10-01 PROCEDURE — 92250 FUNDUS PHOTOGRAPHY W/I&R: CPT | Mod: 59
